# Patient Record
Sex: MALE | Race: AMERICAN INDIAN OR ALASKA NATIVE
[De-identification: names, ages, dates, MRNs, and addresses within clinical notes are randomized per-mention and may not be internally consistent; named-entity substitution may affect disease eponyms.]

---

## 2022-08-22 ENCOUNTER — HOSPITAL ENCOUNTER (EMERGENCY)
Dept: HOSPITAL 5 - ED | Age: 42
LOS: 2 days | Discharge: HOME | End: 2022-08-24
Payer: COMMERCIAL

## 2022-08-22 DIAGNOSIS — I10: ICD-10-CM

## 2022-08-22 DIAGNOSIS — I21.9: ICD-10-CM

## 2022-08-22 DIAGNOSIS — Z90.49: ICD-10-CM

## 2022-08-22 DIAGNOSIS — R07.9: Primary | ICD-10-CM

## 2022-08-22 DIAGNOSIS — F17.200: ICD-10-CM

## 2022-08-22 DIAGNOSIS — J45.909: ICD-10-CM

## 2022-08-22 DIAGNOSIS — I16.0: ICD-10-CM

## 2022-08-22 DIAGNOSIS — Z79.899: ICD-10-CM

## 2022-08-22 LAB
ALBUMIN SERPL-MCNC: 4.7 G/DL (ref 3.9–5)
ALT SERPL-CCNC: 23 UNITS/L (ref 7–56)
BASOPHILS # (AUTO): 0 K/MM3 (ref 0–0.1)
BASOPHILS NFR BLD AUTO: 0.3 % (ref 0–1.8)
BUN SERPL-MCNC: 11 MG/DL (ref 9–20)
BUN/CREAT SERPL: 10 %
CALCIUM SERPL-MCNC: 9.9 MG/DL (ref 8.4–10.2)
EOSINOPHIL # BLD AUTO: 0 K/MM3 (ref 0–0.4)
EOSINOPHIL NFR BLD AUTO: 0.2 % (ref 0–4.3)
HCT VFR BLD CALC: 39.9 % (ref 35.5–45.6)
HEMOLYSIS INDEX: 9
HGB BLD-MCNC: 12.8 GM/DL (ref 11.8–15.2)
LYMPHOCYTES # BLD AUTO: 2.1 K/MM3 (ref 1.2–5.4)
LYMPHOCYTES NFR BLD AUTO: 18 % (ref 13.4–35)
MCHC RBC AUTO-ENTMCNC: 32 % (ref 32–34)
MCV RBC AUTO: 93 FL (ref 84–94)
MONOCYTES # (AUTO): 1 K/MM3 (ref 0–0.8)
MONOCYTES % (AUTO): 8.6 % (ref 0–7.3)
PLATELET # BLD: 449 K/MM3 (ref 140–440)
RBC # BLD AUTO: 4.27 M/MM3 (ref 3.65–5.03)

## 2022-08-22 PROCEDURE — 85025 COMPLETE CBC W/AUTO DIFF WBC: CPT

## 2022-08-22 PROCEDURE — 93005 ELECTROCARDIOGRAM TRACING: CPT

## 2022-08-22 PROCEDURE — 84484 ASSAY OF TROPONIN QUANT: CPT

## 2022-08-22 PROCEDURE — 80053 COMPREHEN METABOLIC PANEL: CPT

## 2022-08-22 PROCEDURE — 99284 EMERGENCY DEPT VISIT MOD MDM: CPT

## 2022-08-22 PROCEDURE — 36415 COLL VENOUS BLD VENIPUNCTURE: CPT

## 2022-08-22 PROCEDURE — 71046 X-RAY EXAM CHEST 2 VIEWS: CPT

## 2022-08-22 NOTE — XRAY REPORT
CHEST 2 VIEWS 



INDICATION / CLINICAL INFORMATION: chest pain. 



COMPARISON: None available.



FINDINGS:



SUPPORT DEVICES: None.

HEART / MEDIASTINUM: Heart size and mediastinal contour appear within normal limits. 

LUNGS / PLEURA: No significant pulmonary or pleural abnormality. No pneumothorax. 

BONES: No significant osseous abnormality.

ADDITIONAL FINDINGS: No significant additional findings.



IMPRESSION:

1. No active cardiopulmonary disease.



Signer Name: Madhu Quintero II, MD 

Signed: 8/22/2022 6:41 AM

Workstation Name: [id number]

## 2022-08-23 VITALS — DIASTOLIC BLOOD PRESSURE: 105 MMHG | SYSTOLIC BLOOD PRESSURE: 160 MMHG

## 2022-08-23 NOTE — EMERGENCY DEPARTMENT REPORT
<BO WOLFF A - Last Filed: 08/23/22 07:47>





ED Chest Pain HPI





- General


Chief Complaint: Chest Pain


Stated Complaint: CHEST PAIN


Time Seen by Provider: 08/23/22 07:45


Source: patient


Mode of arrival: Ambulatory


Limitations: No Limitations





- History of Present Illness


Severity scale (0 -10): 9





- Related Data


                                  Previous Rx's











 Medication  Instructions  Recorded  Last Taken  Type


 


Ibuprofen [Motrin 600 MG tab] 600 mg PO Q8H PRN #30 tablet 02/14/15 Unknown Rx


 


traMADoL [Ultram 50 MG tab] 50 mg PO Q6HR PRN #20 tablet 02/14/15 Unknown Rx


 


HYDROcodone/APAP 7.5-325 [Norco 1 each PO Q6HR PRN #8 tablet 11/07/15 Unknown Rx





7.5-325 mg TAB]    


 


Ondansetron [Zofran Odt] 4 mg SL Q6H PRN #8 tab.rapdis 11/07/15 Unknown Rx


 


Aspirin EC [Ecotrin] 325 mg PO QDAY #30 tablet. 08/18/16 Unknown Rx


 


Simvastatin (Nf) [Zocor TAB] 20 mg PO QHS #30 tablet 08/18/16 Unknown Rx


 


amLODIPine 10 mg PO QDAY #30 tablet 08/18/16 Unknown Rx


 


Sulfamethoxazole/Trimethoprim 1 each PO BID #14 tablet 02/17/17 Unknown Rx





[Bactrim DS TAB]    











                                    Allergies











Allergy/AdvReac Type Severity Reaction Status Date / Time


 


No Known Allergies Allergy   Verified 08/02/16 20:40














ED Review of Systems


Comment: All other systems reviewed and negative





ED Past Medical Hx





- Past Medical History


Previous Medical History?: Yes


Hx Hypertension: Yes


Hx Heart Attack/AMI: Yes ([id number])


Hx Congestive Heart Failure: No


Hx Diabetes: No


Hx Asthma: Yes (childhood asthma resolved)


Hx COPD: No


Additional medical history: Sleep Apnea





- Surgical History


Past Surgical History?: Yes


Hx Coronary Stent: Yes





- Social History


Smoking Status: Current Every Day Smoker


Substance Use Type: Alcohol





- Medications


Home Medications: 


                                Home Medications











 Medication  Instructions  Recorded  Confirmed  Last Taken  Type


 


Ibuprofen [Motrin 600 MG tab] 600 mg PO Q8H PRN #30 tablet 02/14/15  Unknown Rx


 


traMADoL [Ultram 50 MG tab] 50 mg PO Q6HR PRN #20 tablet 02/14/15  Unknown Rx


 


HYDROcodone/APAP 7.5-325 [Norco 1 each PO Q6HR PRN #8 tablet 11/07/15  Unknown 

Rx





7.5-325 mg TAB]     


 


Ondansetron [Zofran Odt] 4 mg SL Q6H PRN #8 tab.rapdis 11/07/15  Unknown Rx


 


Aspirin EC [Ecotrin] 325 mg PO QDAY #30 tablet. 08/18/16  Unknown Rx


 


Simvastatin (Nf) [Zocor TAB] 20 mg PO QHS #30 tablet 08/18/16  Unknown Rx


 


amLODIPine 10 mg PO QDAY #30 tablet 08/18/16  Unknown Rx


 


Sulfamethoxazole/Trimethoprim 1 each PO BID #14 tablet 02/17/17  Unknown Rx





[Bactrim DS TAB]     














ED Physical Exam





- General


Limitations: No Limitations





ED Course





- Reevaluation(s)


Reevaluation #1: 





08/23/22 07:48


NORVASC 


LISINOPRIL





ED Medical Decision Making





- Lab Data


Result diagrams: 


                                 08/22/22 05:57





                                 08/22/22 05:57





ED Disposition


Clinical Impression: 


 Hypertensive urgency





Chest pain


Qualifiers:


 Chest pain type: unspecified Qualified Code(s): R07.9 - Chest pain, unspecified





Disposition: 01 HOME / SELF CARE / HOMELESS


Condition: Stable


Instructions:  Nonspecific Chest Pain, Adult, Easy-to-Read, Hypertension, Adult,

Easy-to-Read, Managing Your Hypertension


Additional Instructions: 


Cut down your sodium in your food to help your blood pressure





Increase your daily fluid to help your hydration





Call and schedule follow-up with your primary doctor in the next 3 to 5 days for

progress





It is very important that you consistent with your antihypertensive medication 

as prescribed by your primary doctor





Please do not hesitate to call or return to emergency if your symptoms


Referrals: 


PRIMARY CARE,MD [Primary Care Provider] - 3-5 Days





<JIE PRATER - Last Filed: 08/24/22 04:49>





ED Chest Pain HPI





- History of Present Illness


Initial Comments: 





42-year-old male with a history of hypertension who now presents with chest 

pressure associated with elevated blood pressure with a systolic of about 200.  

Patient reported that he has been in the ER for the last 24 hours and does not 

get a chance to come into the main ER.  Patient denies any shortness of breath 

or palpitation.  No fever or chills reported.  No other modifying or associated 

factors reported.





Heart Score





- HEART Score


History: Slightly suspicious


EKG: Normal


Age: < 45


Risk factors: 1-2 risk factors


Troponin: < normal limit


HEART Score: 1





- EKG Read Time


Time EKG Completed: 03:44


EKG Read Time: 03:50





- Critical Actions


Critical Actions: 0-3 pts:0.9-1.7%risk of adverse cardiac event.Candidate for 

discharge





ED Review of Systems


ROS: 


Stated complaint: CHEST PAIN


Other details as noted in HPI





Comment: All other systems reviewed and negative


Cardiovascular: chest pain





ED Physical Exam





- General


General appearance: alert, in no apparent distress





- Head


Head exam: Present: normal inspection





- Eye


Eye exam: Present: normal appearance


Pupils: Present: normal accommodation





- ENT


ENT exam: Present: normal exam, normal orophraynx, mucous membranes moist





- Neck


Neck exam: Present: normal inspection, full ROM.  Absent: tenderness





- Respiratory


Respiratory exam: Present: normal lung sounds bilaterally.  Absent: respiratory 

distress, accessory muscle use





- Cardiovascular


Cardiovascular Exam: Present: regular rate, normal rhythm, normal heart sounds





- GI/Abdominal


GI/Abdominal exam: Present: soft, normal bowel sounds.  Absent: distended, 

tenderness





- Extremities Exam


Extremities exam: Present: normal inspection, normal capillary refill.  Absent: 

pedal edema





- Back Exam


Back exam: Absent: tenderness





- Neurological Exam


Neurological exam: Present: alert, oriented X3





- Psychiatric


Psychiatric exam: Present: normal affect, normal mood





- Skin


Skin exam: Present: warm, normal color





ED Course


                                   Vital Signs











  08/22/22 08/22/22 08/23/22





  04:19 23:28 07:46


 


Temperature 97.7 F 98.7 F 


 


Pulse Rate 101 H 91 H 62


 


Respiratory 18 18 14





Rate   


 


Blood Pressure 166/100  


 


Blood Pressure  190/103 160/105





[Right]   


 


O2 Sat by Pulse 96 96 97





Oximetry   














STANLEY score





- Stanley Score


Age > 65: (0) No


Aspirin use within the Past 7 Days: (0) No


3 or more CAD Risk Factors: (0) No


2 or more Angina events in past 24 hrs: (0) No


Known CAD with more than 50% Stenosis: (0) No


Elevated Cardiac Markers: (0) No


ST Deviation Greater than 0.5mm: (0) No


STANLEY Score: 0





ED Medical Decision Making





- Lab Data


Result diagrams: 


                                 08/22/22 05:57





                                 08/22/22 05:57





- EKG Data


-: EKG Interpreted by Me


EKG shows normal: sinus rhythm





- EKG Data





08/24/22 04:46


Noted normal sinus rhythm at a rate of 82 bpm, with left atrial enlargement and 

this abnormal ECG.





- Medical Decision Making





Here with chest pain/pressure--differential could be but not limited to 

myocardial infarction, pulmonary embolism, costochondritis, anxiety, gastritis, 

GERD, pancreatitis, and or pyelonephritis--in order to rule out the above-- so 

will go ahead and order routine cardiopulmonary work-up that include troponin, 

EKG, chest x-ray, BNP, CKMB, and CBC, CMP and urinalysis for any correctable 

infectious process or electrolyte abnormality as a cause.








This patient chest pain is likely caused as a result of the pressure from his 

hypertension urgency--however because of his age we will go ahead and follow-up 

the above labs to rule out any cardiac or pulmonary involvement.





Noted with improved blood pressure with systolic around 160 mmHg--and noted with

 unremarkable labs including 3 serial troponins--





Critical care attestation.: 


If time is entered above; I have spent that time in minutes in the direct care 

of this critically ill patient, excluding procedure time.








ED Disposition


Is pt being admited?: No


Does the pt Need Aspirin: No


Time of Disposition: 04:49

## 2022-08-23 NOTE — ELECTROCARDIOGRAPH REPORT
[facility]

                                       

Test Date:    2022               Test Time:    05:11:31

Pat Name:     ARNOLD GARCIA             Department:   

Patient ID:   [id number]          Room:          

Gender:       M                        Technician:   EMERALD

:          1980               Requested By: ED DOC

Order Number: [id number]             Reading MD:   Allen Reddy

                                 Measurements

Intervals                              Axis          

Rate:         100                      P:            53

NM:           146                      QRS:          -66

QRSD:         91                       T:            131

QT:           334                                    

QTc:          431                                    

                           Interpretive Statements

Sinus tachycardia

Left anterior fascicular block

Consider left ventricular hypertrophy

Nonspecific T abnormalities, lateral leads

No previous ECG available for comparison

Electronically Signed On 2022 8:24:19 EDT by Allen Reddy

## 2022-08-23 NOTE — ELECTROCARDIOGRAPH REPORT
[facility]

                                       

Test Date:    2022               Test Time:    07:55:27

Pat Name:     ARNOLD GARCIA             Department:   

Patient ID:   [id number]          Room:          

Gender:       M                        Technician:   NADEEN

:          1980               Requested By: BO WOLFF

Order Number: [id number]             Reading MD:   Allen Reddy

                                 Measurements

Intervals                              Axis          

Rate:         74                       P:            72

NH:           162                      QRS:          -64

QRSD:         87                       T:            -73

QT:           362                                    

QTc:          398                                    

                           Interpretive Statements

Sinus rhythm

Ventricular premature complex

Left anterior fascicular block

Nonspecific T abnormalities, diffuse leads

Compared to ECG 2022 05:11:31

Ventricular premature complex(es) now present

Sinus tachycardia no longer present

T-wave abnormality still present

Electronically Signed On 2022 8:42:16 EDT by Allen Reddy

## 2022-08-25 NOTE — ELECTROCARDIOGRAPH REPORT
[facility]

                                       

Test Date:    2022               Test Time:    03:44:45

Pat Name:     ARNOLD GARCIA             Department:   

Patient ID:   [id number]          Room:          

Gender:       M                        Technician:   EMERALD

:          1980               Requested By: JIE PRATER

Order Number: [id number]             Reading MD:   Allen Reddy

                                 Measurements

Intervals                              Axis          

Rate:         82                       P:            69

MI:           146                      QRS:          -59

QRSD:         87                       T:            61

QT:           342                                    

QTc:          399                                    

                           Interpretive Statements

Sinus rhythm

Probable left atrial enlargement

Left anterior fascicular block

nonspecific st-t

Compared to ECG 2022 07:55:27

Ventricular premature complex(es) no longer present

T-wave abnormality no longer present

Electronically Signed On 2022 9:50:37 EDT by Allen Reddy